# Patient Record
(demographics unavailable — no encounter records)

---

## 2017-11-17 PROBLEM — F41.0 PANIC ATTACK: Status: ACTIVE | Noted: 2017-11-17

## 2017-11-17 PROBLEM — F41.9 ANXIETY: Status: ACTIVE | Noted: 2017-11-17

## 2017-11-17 PROBLEM — R61 NIGHT SWEAT: Status: ACTIVE | Noted: 2017-11-17

## 2017-11-17 PROCEDURE — 82746 ASSAY OF FOLIC ACID SERUM: CPT | Performed by: INTERNAL MEDICINE

## 2017-11-17 PROCEDURE — 82607 VITAMIN B-12: CPT | Performed by: INTERNAL MEDICINE

## 2017-11-24 PROCEDURE — 36415 COLL VENOUS BLD VENIPUNCTURE: CPT | Performed by: INTERNAL MEDICINE

## 2017-11-24 PROCEDURE — 84403 ASSAY OF TOTAL TESTOSTERONE: CPT | Performed by: INTERNAL MEDICINE

## 2019-07-20 NOTE — ED NOTES
PT unsure as to what was sent to wife.  PT states \"we had a fight and I walked out to cool off, she probably called 911 because she was worried about me\"

## 2019-07-20 NOTE — ED PROVIDER NOTES
Patient Seen in: [facility] Emergency Department    History   Patient presents with:  Eval-P (psychiatric)    Stated Complaint:     HPI    Patient is a 60-year-old male who brought in for psychiatric evaluation.   Patient had a disagreement with his Physical Exam  GENERAL: Patient resting comfortably on the cart in no acute distress. HEENT: Extraocular muscles intact, pupils equal round reactive to light and accommodation. Mouth normal, neck supple, no meningismus.   LUNGS: Lungs clear to [employer] Please view results for these tests on the individual orders. CBC W/ DIFFERENTIAL              MDM     Patient was evaluated by [facility] crisis worker. Patient again denies suicidal ideation, no homicidal ideation.   Wife feels comfor

## 2019-07-20 NOTE — ED INITIAL ASSESSMENT (HPI)
PT had argument with wife, sending text that was suspicious for suicide attempt. EMS found PT in car, car off with windows down, denying SI or HI.

## 2019-07-21 NOTE — BH LEVEL OF CARE ASSESSMENT
Level of Care Assessment Note    General Questions  Why are you here?: Pt is a 29 yr old male who arrived to the ER today via ambulance after leaving his home unexpectedly and turning his phone off. Pt turned his phone on an hour later and texted wife.  Pt argument with wife, sending text that was suspicious for suicide attempt. EMS found PT in car, car off with windows down, denying SI or HI. \" \"PT unsure as to what was sent to wife.  PT states \"we had a fight and I walked out to cool off, she probably dano happy but once in a while he spirals into a depression for 1-2days then seems better; wife states pt has always told her that he wouldn't do anything because of her and their daughter; wife reports being worried about pt earlier today d/t he hasn't left th he feels he \"comes up short\" in regards to providing for family; denies changes in daily tasks / wife states most of the time pt is happy but once in a while he spirals into a depression for 1-2 days then seems better  Anxiety Symptoms: Generalized;Panic environment a supportive place for recovery?: Yes  Describe: resides with wife     Withdrawal Symptoms  History of Withdrawal Symptoms: Denies past symptoms  Current Withdrawal Symptoms: No    Compulsive Behaviors  Are you/others concerned about any of the Ordinary  Clarity: Clear  Cognition  Concentration: Unimpaired  Memory: Recent memory intact; Remote memory intact  Orientation Level: Oriented X4  Insight: Good  Judgment: Good  Thought Patterns  Clarity/Relevance: Coherent;Logical;Relevant to topic  Flow: stress;Stressful life events or loss  Protective Factors: family and their 11 mo. old child    Motivational Stage of Change  Motivational Stage of Change: Contemplative    Level of Care Recommendations  Consulted with: Dr. Sanam Tidwell of [facility]

## (undated) NOTE — ED AVS SNAPSHOT
Claudia Camacho   MRN: [id number]    Department:  [facility] Emergency Department   Date of Visit:  7/20/2019           Disclosure     Insurance plans vary and the physician(s) referred by the ER may not be covered by your plan.  Please contact your in tell this physician (or your personal doctor if your instructions are to return to your personal doctor) about any new or lasting problems. The primary care or specialist physician will see patients referred from the [facility] Emergency Department.  Jackeline Murdock